# Patient Record
Sex: FEMALE | Race: BLACK OR AFRICAN AMERICAN | NOT HISPANIC OR LATINO | Employment: UNEMPLOYED | URBAN - METROPOLITAN AREA
[De-identification: names, ages, dates, MRNs, and addresses within clinical notes are randomized per-mention and may not be internally consistent; named-entity substitution may affect disease eponyms.]

---

## 2018-08-05 ENCOUNTER — HOSPITAL ENCOUNTER (EMERGENCY)
Facility: OTHER | Age: 43
Discharge: HOME | End: 2018-08-05
Attending: EMERGENCY MEDICINE

## 2018-08-05 VITALS
WEIGHT: 150 LBS | BODY MASS INDEX: 27.6 KG/M2 | HEART RATE: 68 BPM | HEIGHT: 62 IN | RESPIRATION RATE: 16 BRPM | SYSTOLIC BLOOD PRESSURE: 145 MMHG | TEMPERATURE: 99 F | DIASTOLIC BLOOD PRESSURE: 92 MMHG

## 2018-08-05 DIAGNOSIS — K08.89 PAIN, DENTAL: Primary | ICD-10-CM

## 2018-08-05 LAB
B-HCG UR QL: NEGATIVE
CTP QC/QA: YES

## 2018-08-05 PROCEDURE — 99283 EMERGENCY DEPT VISIT LOW MDM: CPT | Mod: 25

## 2018-08-05 PROCEDURE — 81025 URINE PREGNANCY TEST: CPT | Performed by: EMERGENCY MEDICINE

## 2018-08-05 RX ORDER — AMOXICILLIN 875 MG/1
875 TABLET, FILM COATED ORAL 2 TIMES DAILY
Qty: 14 TABLET | Refills: 0 | Status: SHIPPED | OUTPATIENT
Start: 2018-08-05

## 2018-08-05 RX ORDER — NAPROXEN 500 MG/1
500 TABLET ORAL 2 TIMES DAILY WITH MEALS
Qty: 10 TABLET | Refills: 0 | Status: SHIPPED | OUTPATIENT
Start: 2018-08-05 | End: 2018-08-10

## 2018-08-05 RX ORDER — KETOROLAC TROMETHAMINE 30 MG/ML
15 INJECTION, SOLUTION INTRAMUSCULAR; INTRAVENOUS
Status: DISCONTINUED | OUTPATIENT
Start: 2018-08-05 | End: 2018-08-05 | Stop reason: HOSPADM

## 2018-08-06 NOTE — ED NOTES
Acute on chronic R foot pain x 2 years and new MATT molar pain x2 days, cracked molar w/ leola to MATT side. Pt is AAOx3, ambulatory with steady gait, respirations even and unlabored, skin is warm and dry with good turgor, no bruising or breakdown noted

## 2018-08-06 NOTE — ED PROVIDER NOTES
"Encounter Date: 8/5/2018    SCRIBE #1 NOTE: I, Mana Plunkett, am scribing for, and in the presence of, Dr. Lion.       History     Chief Complaint   Patient presents with    Dental Pain     top left x "a few days"    Foot Pain     right foot pain from fall years ago     Time seen by provider: 7:19 PM    This is a 43 y.o. female who presents with multiple complaints. Pt reports intermittent R foot pain since a slip and fall a few years ago. It is to the dorsal aspect. No new injury. Pt has seen another physician for the pain. She also c/o L upper tooth pain. She states that the tooth broke a few days ago. No relief with 800mg ibuprofen and Tylenol extra strength. Pt has been unable to see her dentist. She denies any fever, chills, facial swelling, drooling, cough, numbness, weakness, tingling, rash, and wound.      The history is provided by the patient.     Review of patient's allergies indicates:   Allergen Reactions    Peanut      History reviewed. No pertinent past medical history.  History reviewed. No pertinent surgical history.  History reviewed. No pertinent family history.  Social History   Substance Use Topics    Smoking status: Never Smoker    Smokeless tobacco: Not on file    Alcohol use Yes     Review of Systems   Constitutional: Negative for chills and fever.   HENT: Positive for dental problem (L upper molar). Negative for congestion, drooling, facial swelling, rhinorrhea and sore throat.    Respiratory: Negative for cough and shortness of breath.    Cardiovascular: Negative for chest pain.   Gastrointestinal: Negative for abdominal pain, diarrhea, nausea and vomiting.   Endocrine: Negative for polyuria.   Genitourinary: Negative for decreased urine volume and dysuria.   Musculoskeletal: Negative for back pain.        R foot pain.   Skin: Negative for rash and wound.   Allergic/Immunologic: Negative for immunocompromised state.   Neurological: Negative for dizziness, weakness and numbness. "        Negative for tingling.   Hematological: Does not bruise/bleed easily.   Psychiatric/Behavioral: Negative for confusion.       Physical Exam     Initial Vitals [08/05/18 1903]   BP Pulse Resp Temp SpO2   (!) 145/92 68 16 98.7 °F (37.1 °C) --      MAP       --         Physical Exam    Nursing note and vitals reviewed.  Constitutional: She appears well-developed and well-nourished. She is not diaphoretic. No distress.   HENT:   Head: Normocephalic and atraumatic.   Right Ear: External ear normal.   Left Ear: External ear normal.   Dental fracture to the L upper molar. No abscess.   Eyes: Right eye exhibits no discharge. Left eye exhibits no discharge.   Neck: Normal range of motion. Neck supple.   Cardiovascular:   Strong R DP pulse.   Pulmonary/Chest: No respiratory distress.   Musculoskeletal: Normal range of motion.   No R foot tenderness.   Neurological: She is alert and oriented to person, place, and time.   Skin: Skin is warm and dry. No rash noted. No erythema.   Psychiatric: She has a normal mood and affect. Her behavior is normal.         ED Course   Procedures  Labs Reviewed   POCT URINE PREGNANCY           Medical Decision Making:   Initial Assessment:   I recommend we give Toradol injection and discharge home on antiinflammatory and abx. Pt was not satisfied with the solution and requested to be prescribed percocet. I do not believe this was indicated and have concern for prescribing opioids with this presentation. We were at an impasse but will order medication so she can decide if she will take them. No medical emergency identified.   Clinical Tests:   Lab Tests: Ordered and Reviewed            Scribe Attestation:   Scribe #1: I performed the above scribed service and the documentation accurately describes the services I performed. I attest to the accuracy of the note.    Attending Attestation:           Physician Attestation for Scribe:  Physician Attestation Statement for Scribe #1: I  Ayad, reviewed documentation, as scribed by Mana Plunkett in my presence, and it is both accurate and complete.                    Clinical Impression:     1. Pain, dental            Disposition:   Disposition: Discharged  Condition: Stable                        Bill Lion MD  08/05/18 1942

## 2022-05-03 ENCOUNTER — HOSPITAL ENCOUNTER (EMERGENCY)
Facility: HOSPITAL | Age: 47
Discharge: HOME | End: 2022-05-03
Attending: EMERGENCY MEDICINE
Payer: MEDICARE

## 2022-05-03 VITALS
WEIGHT: 190 LBS | DIASTOLIC BLOOD PRESSURE: 85 MMHG | SYSTOLIC BLOOD PRESSURE: 163 MMHG | OXYGEN SATURATION: 95 % | HEIGHT: 66 IN | TEMPERATURE: 98 F | RESPIRATION RATE: 16 BRPM | BODY MASS INDEX: 30.53 KG/M2 | HEART RATE: 80 BPM

## 2022-05-03 DIAGNOSIS — M25.521 ELBOW PAIN, RIGHT: ICD-10-CM

## 2022-05-03 DIAGNOSIS — S30.0XXA CONTUSION OF BUTTOCK, INITIAL ENCOUNTER: ICD-10-CM

## 2022-05-03 DIAGNOSIS — S39.012A LUMBAR STRAIN, INITIAL ENCOUNTER: ICD-10-CM
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
## 2022-05-03 PROCEDURE — 25000003 PHARM REV CODE 250: Performed by: EMERGENCY MEDICINE

## 2022-05-03 PROCEDURE — 99284 EMERGENCY DEPT VISIT MOD MDM: CPT | Mod: 25

## 2022-05-03 RX ORDER — NAPROXEN 500 MG/1
500 TABLET ORAL 2 TIMES DAILY WITH MEALS
Qty: 20 TABLET | Refills: 0 | Status: SHIPPED | OUTPATIENT
Start: 2022-05-03 | End: 2022-05-08

## 2022-05-03 RX ORDER — CYCLOBENZAPRINE HCL 10 MG
10 TABLET ORAL 3 TIMES DAILY PRN
Qty: 20 TABLET | Refills: 0 | Status: SHIPPED | OUTPATIENT
Start: 2022-05-03 | End: 2022-05-13

## 2022-05-03 RX ORDER — METHOCARBAMOL 500 MG/1
1500 TABLET, FILM COATED ORAL
Status: COMPLETED | OUTPATIENT
Start: 2022-05-03 | End: 2022-05-03

## 2022-05-03 RX ORDER — HYDROCODONE BITARTRATE AND ACETAMINOPHEN 10; 325 MG/1; MG/1
1 TABLET ORAL
Status: COMPLETED | OUTPATIENT
Start: 2022-05-03 | End: 2022-05-03

## 2022-05-03 RX ORDER — HYDROCODONE BITARTRATE AND ACETAMINOPHEN 5; 325 MG/1; MG/1
1 TABLET ORAL EVERY 6 HOURS PRN
Qty: 12 TABLET | Refills: 0 | Status: SHIPPED | OUTPATIENT
Start: 2022-05-03 | End: 2022-05-13

## 2022-05-03 RX ORDER — NAPROXEN 500 MG/1
500 TABLET ORAL
Status: COMPLETED | OUTPATIENT
Start: 2022-05-03 | End: 2022-05-03

## 2022-05-03 RX ADMIN — HYDROCODONE BITARTRATE AND ACETAMINOPHEN 1 TABLET: 10; 325 TABLET ORAL at 12:05

## 2022-05-03 RX ADMIN — NAPROXEN 500 MG: 500 TABLET ORAL at 12:05

## 2022-05-03 RX ADMIN — METHOCARBAMOL 1500 MG: 500 TABLET ORAL at 12:05

## 2022-05-03 NOTE — ED PROVIDER NOTES
Encounter Date: 5/3/2022    SCRIBE #1 NOTE: I, Eladia Briseno, am scribing for, and in the presence of,  Gerald Monroe MD. I have scribed the following portions of the note - Other sections scribed: HPI, ROS, PE.       History     Chief Complaint   Patient presents with    Fall     Pt chief complaint is a slip and fall, pt states slipped in some water at [employer] complaining of lower back and right elbow pain. Pt denies loc.      Beau Richards, a 46 y.o. female with a pertinent past medical history of hypertension, presents to the ED with concerns after a fall that occurred today. Patient reports shopping a [employer] when she suddenly slipped on a puddle of water and fell. She fell on her right elbow, back, and buttocks. She did not hit her head or lose consciousness. She has associated symptoms of headaches, lower back pain, right flank pain, right elbow pain, buttocks pain, and upper, posterior right leg pain. She is compliant with her hypertension medication. No other exacerbating or alleviating factors. Patient denies blurred vision, nausea, vomiting, chest pain, abdominal pain, or other associated symptoms.     The history is provided by the patient. No  was used.     Review of patient's allergies indicates:   Allergen Reactions    Peanut      Past Medical History:   Diagnosis Date    Hypertension      Past Surgical History:   Procedure Laterality Date    NO PAST SURGERIES       History reviewed. No pertinent family history.  Social History     Tobacco Use    Smoking status: Never Smoker    Smokeless tobacco: Never Used   Substance Use Topics    Alcohol use: Yes    Drug use: No     Review of Systems   Eyes: Negative for visual disturbance.   Cardiovascular: Negative for chest pain.   Gastrointestinal: Negative for abdominal pain, nausea and vomiting.   Musculoskeletal: Positive for back pain (Lower).        (+) Buttocks pain  (+) Right elbow pain  (+) Upper, posterior  right leg pain  (+) Right flank pain   Neurological: Positive for headaches.       Physical Exam     Initial Vitals [05/03/22 1122]   BP Pulse Resp Temp SpO2   (!) 156/84 72 18 98 °F (36.7 °C) 96 %      MAP       --         Physical Exam    Nursing note and vitals reviewed.  Constitutional: She appears well-developed and well-nourished.   HENT:   Head: Normocephalic and atraumatic.   Eyes: EOM are normal. Pupils are equal, round, and reactive to light.   Neck: Neck supple. No thyromegaly present. No JVD present.   Normal range of motion.  Cardiovascular: Normal rate and regular rhythm. Exam reveals no gallop and no friction rub.    No murmur heard.  Pulmonary/Chest: Breath sounds normal. No respiratory distress.   Abdominal: Abdomen is soft. Bowel sounds are normal. There is no abdominal tenderness.   Musculoskeletal:         General: No edema. Normal range of motion.      Cervical back: Normal range of motion and neck supple.      Comments: L3, L4, L5 tenderness.  Right buttocks pain.  Decreased ROM of the right hip secondary to pain.  Decreased ROM of the right elbow secondary to pain.     Neurological: She is alert and oriented to person, place, and time. She has normal strength. GCS score is 15. GCS eye subscore is 4. GCS verbal subscore is 5. GCS motor subscore is 6.   Skin: Skin is warm and dry. Capillary refill takes less than 2 seconds.   Psychiatric: She has a normal mood and affect.         ED Course   Procedures  Labs Reviewed - No data to display       Imaging Results          X-Ray Elbow Complete Right (Final result)  Result time 05/03/22 13:50:09    Final result by Mario Kim MD (05/03/22 13:50:09)                 Impression:      No convincing evidence of acute fracture or dislocation.      Electronically signed by: Mario Kim  Date:    05/03/2022  Time:    13:50             Narrative:    EXAMINATION:  XR ELBOW COMPLETE 3 VIEW RIGHT    CLINICAL HISTORY:  . Pain in right  elbow    TECHNIQUE:  AP, lateral, and oblique views of the right elbow were performed.    COMPARISON:  None    FINDINGS:  No definite evidence of acute fracture or dislocation.  Joint spaces appear maintained.  No large elbow joint effusion is appreciated.  No radiopaque foreign body.                               X-Ray Lumbar Spine Ap And Lateral (Final result)  Result time 05/03/22 13:51:19    Final result by Mario Kim MD (05/03/22 13:51:19)                 Impression:      No convincing evidence of acute fracture or traumatic subluxation.      Electronically signed by: Mario Kim  Date:    05/03/2022  Time:    13:51             Narrative:    EXAMINATION:  XR LUMBAR SPINE AP AND LATERAL    CLINICAL HISTORY:  Lumbago s/p fall;    TECHNIQUE:  AP, lateral and spot images were performed of the lumbar spine.    COMPARISON:  None    FINDINGS:  Five non-rib-bearing lumbar-type vertebral bodies are seen.  No definite evidence of acute fracture or traumatic subluxation.  Vertebral body heights appear maintained.  No definite acute findings are seen in the visualized portions of the abdomen pelvis.  Phleboliths appear to project within the pelvis.  No acute soft tissue findings seen.                               X-Ray Hip 2 or 3 views Right (with Pelvis when performed) (Final result)  Result time 05/03/22 13:52:22    Final result by Mario Kim MD (05/03/22 13:52:22)                 Impression:      No convincing evidence of acute displaced fracture or dislocation.      Electronically signed by: Mario Kim  Date:    05/03/2022  Time:    13:52             Narrative:    EXAMINATION:  XR HIP WITH PELVIS WHEN PERFORMED, 2 OR 3  VIEWS RIGHT    CLINICAL HISTORY:  right hip pain;    TECHNIQUE:  AP view of the pelvis and frog leg lateral view of the right hip were performed.    COMPARISON:  None    FINDINGS:  No definite evidence of acute fracture or dislocation.  Joint spaces appear maintained.   Phleboliths appear to project within the pelvis.  No acute soft tissue findings seen.                                 Medications   HYDROcodone-acetaminophen  mg per tablet 1 tablet (1 tablet Oral Given 5/3/22 1209)   methocarbamoL tablet 1,500 mg (1,500 mg Oral Given 5/3/22 1209)   naproxen tablet 500 mg (500 mg Oral Given 5/3/22 1209)     Medical Decision Making:   History:   Old Medical Records: I decided to obtain old medical records.          Scribe Attestation:   Scribe #1: I performed the above scribed service and the documentation accurately describes the services I performed. I attest to the accuracy of the note.                 Clinical Impression:   Final diagnoses:  [M25.521] Elbow pain, right  [W19.XXXA] Fall, initial encounter (Primary)  [S39.012A] Lumbar strain, initial encounter  [S30.0XXA] Contusion of buttock, initial encounter          ED Disposition Condition    Discharge Stable       I, Gerald Monroe, personally performed the services described in this documentation. All medical record entries made by the scribe were at my direction and in my presence. I have reviewed the chart and agree that the record reflects my personal performance and is accurate and complete.    ED Prescriptions     Medication Sig Dispense Start Date End Date Auth. Provider    HYDROcodone-acetaminophen (NORCO) 5-325 mg per tablet Take 1 tablet by mouth every 6 (six) hours as needed (breakthrough pain). 12 tablet 5/3/2022 5/13/2022 Gerald Monroe MD    cyclobenzaprine (FLEXERIL) 10 MG tablet Take 1 tablet (10 mg total) by mouth 3 (three) times daily as needed for Muscle spasms. 20 tablet 5/3/2022 5/13/2022 Gerald Monroe MD    naproxen (NAPROSYN) 500 MG tablet Take 1 tablet (500 mg total) by mouth 2 (two) times daily with meals. for 5 days 20 tablet 5/3/2022 5/8/2022 Gerald Monroe MD        Follow-up Information     Follow up With Specialties Details Why Contact Info    Diana Hsu MD  Pediatrics Schedule an appointment as soon as possible for a visit   [address] [facility] [address]  [phone]      [facility] Emergency Dept Emergency Medicine  As needed, If symptoms worsen [address]  [facility] [address] [phone]    Chemo Oscar MD Orthopedic Surgery Schedule an appointment as soon as possible for a visit in 1 week As needed if elbow or other injuries are not improving. [address]  [address]  [facility] [address]  [phone]             Gerald Monroe MD  05/05/22 0631
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
## 2022-05-09 ENCOUNTER — HOSPITAL ENCOUNTER (EMERGENCY)
Facility: HOSPITAL | Age: 47
Discharge: HOME | End: 2022-05-09
Attending: EMERGENCY MEDICINE
Payer: COMMERCIAL

## 2022-05-09 VITALS
HEART RATE: 75 BPM | OXYGEN SATURATION: 98 % | WEIGHT: 175 LBS | TEMPERATURE: 99 F | BODY MASS INDEX: 28.12 KG/M2 | SYSTOLIC BLOOD PRESSURE: 142 MMHG | HEIGHT: 66 IN | DIASTOLIC BLOOD PRESSURE: 77 MMHG | RESPIRATION RATE: 18 BRPM
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
## 2022-05-09 PROCEDURE — 25000003 PHARM REV CODE 250: Performed by: PHYSICIAN ASSISTANT

## 2022-05-09 PROCEDURE — 99283 EMERGENCY DEPT VISIT LOW MDM: CPT | Mod: 25

## 2022-05-09 RX ORDER — ACETAMINOPHEN 500 MG
1000 TABLET ORAL
Status: COMPLETED | OUTPATIENT
Start: 2022-05-09 | End: 2022-05-09

## 2022-05-09 RX ADMIN — ACETAMINOPHEN 1000 MG: 500 TABLET ORAL at 04:05

## 2022-05-09 NOTE — DISCHARGE INSTRUCTIONS
Get some good rest.  Ice to the area to help with swelling and discomfort pain continue with Tylenol or ibuprofen as needed for pain.    Follow-up with primary care provider for re-evaluation should symptoms persist.  Return to this ED if knee becomes red and warm, if unable to walk or bear weight, if any other problems occur.
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
## 2022-12-28 ENCOUNTER — HOSPITAL ENCOUNTER (EMERGENCY)
Facility: OTHER | Age: 47
Discharge: HOME | End: 2022-12-28
Attending: EMERGENCY MEDICINE
Payer: MEDICARE

## 2022-12-28 VITALS
DIASTOLIC BLOOD PRESSURE: 87 MMHG | TEMPERATURE: 98 F | BODY MASS INDEX: 32.14 KG/M2 | OXYGEN SATURATION: 99 % | HEART RATE: 89 BPM | HEIGHT: 66 IN | WEIGHT: 200 LBS | RESPIRATION RATE: 20 BRPM | SYSTOLIC BLOOD PRESSURE: 150 MMHG
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
## 2022-12-28 PROCEDURE — 90471 IMMUNIZATION ADMIN: CPT | Performed by: PHYSICIAN ASSISTANT

## 2022-12-28 PROCEDURE — 90715 TDAP VACCINE 7 YRS/> IM: CPT | Performed by: PHYSICIAN ASSISTANT

## 2022-12-28 PROCEDURE — 25000003 PHARM REV CODE 250: Performed by: PHYSICIAN ASSISTANT

## 2022-12-28 PROCEDURE — 63600175 PHARM REV CODE 636 W HCPCS: Performed by: PHYSICIAN ASSISTANT

## 2022-12-28 PROCEDURE — 99284 EMERGENCY DEPT VISIT MOD MDM: CPT | Mod: 25

## 2022-12-28 RX ORDER — IBUPROFEN 600 MG/1
600 TABLET ORAL EVERY 6 HOURS PRN
Qty: 20 TABLET | Refills: 0 | Status: SHIPPED | OUTPATIENT
Start: 2022-12-28

## 2022-12-28 RX ORDER — IBUPROFEN 600 MG/1
600 TABLET ORAL
Status: COMPLETED | OUTPATIENT
Start: 2022-12-28 | End: 2022-12-28

## 2022-12-28 RX ORDER — BACITRACIN ZINC 500 UNIT/G
OINTMENT (GRAM) TOPICAL
Status: COMPLETED | OUTPATIENT
Start: 2022-12-28 | End: 2022-12-28

## 2022-12-28 RX ORDER — AMOXICILLIN AND CLAVULANATE POTASSIUM 875; 125 MG/1; MG/1
1 TABLET, FILM COATED ORAL 2 TIMES DAILY
Qty: 14 TABLET | Refills: 0 | Status: SHIPPED | OUTPATIENT
Start: 2022-12-28 | End: 2023-01-04

## 2022-12-28 RX ADMIN — BACITRACIN ZINC: 500 OINTMENT TOPICAL at 05:12

## 2022-12-28 RX ADMIN — IBUPROFEN 600 MG: 600 TABLET, FILM COATED ORAL at 04:12

## 2022-12-28 RX ADMIN — CLOSTRIDIUM TETANI TOXOID ANTIGEN (FORMALDEHYDE INACTIVATED), CORYNEBACTERIUM DIPHTHERIAE TOXOID ANTIGEN (FORMALDEHYDE INACTIVATED), BORDETELLA PERTUSSIS TOXOID ANTIGEN (GLUTARALDEHYDE INACTIVATED), BORDETELLA PERTUSSIS FILAMENTOUS HEMAGGLUTININ ANTIGEN (FORMALDEHYDE INACTIVATED), BORDETELLA PERTUSSIS PERTACTIN ANTIGEN, AND BORDETELLA PERTUSSIS FIMBRIAE 2/3 ANTIGEN 0.5 ML: 5; 2; 2.5; 5; 3; 5 INJECTION, SUSPENSION INTRAMUSCULAR at 04:12

## 2022-12-28 NOTE — ED TRIAGE NOTES
C/o dog bite to right lateral calf. Reports it was her neighbors dog that is vaccinated. Small puncture wound noted, bleeding controlled. Denies other injury. Unknown last tetanus. Also reports right upper dental pain for the last 2 weeks. Dental cavity noted to right molar. No facial swelling or fever noted.

## 2022-12-29 NOTE — ED PROVIDER NOTES
"     Source of History:  Patient     Chief complaint:  Leg Pain (Pt presented to ER with c/o leg pain due to an abrasion from breaking up a dog fight. Patient also c/o dental pain.)      HPI:  Beau Richards is a 47 y.o. female with hypertension who is presenting to emergency department with dog bite wound to right calf.  She was breaking up a fight between her dog and a neighbor's dog when the dog bit her at approximately 1:00 p.m. today.  Bleeding has subsided.  She is also reporting dental pain and gum swelling to right upper mouth.  No facial swelling, fever or trouble swallowing.  This is the extent to the patients complaints today here in the emergency department.    ROS: As per HPI and below:  General: No fever.  No chills.  No fatigue.  ENT: + dental pain and gum swelling  Neurologic: No focal weakness.  No numbness.  MSK: no  joint pain   Integument: + dog bite to right calf   Allergy/immunology:  Not immunocompromised.     Review of patient's allergies indicates:   Allergen Reactions    Peanut        PMH:  As per HPI and below:  Past Medical History:   Diagnosis Date    Hypertension      Past Surgical History:   Procedure Laterality Date    NO PAST SURGERIES         Social History     Tobacco Use    Smoking status: Never    Smokeless tobacco: Never   Substance Use Topics    Alcohol use: Yes    Drug use: No       Physical Exam:    BP (!) 150/87   Pulse 89   Temp 98 °F (36.7 °C)   Resp 20   Ht 5' 6" (1.676 m)   Wt 90.7 kg (200 lb)   SpO2 99%   BMI 32.28 kg/m²   Nursing note and vital signs reviewed.  Appearance: No acute distress.  ENT: Tenderness to percussion to right upper molars was gum tenderness as well.  No drainable abscess.  No facial swelling or trismus.  Musculoskeletal: Good range of motion all joints.  No deformities.  Neck supple.  No meningismus.  Skin: 1 cm abrasion to lateral right thigh. No ecchymosis   Neurologic: Motor intact.  Sensation intact.  Mental Status:  Alert and oriented " x 3.  Appropriate, conversant.   Labs that have been ordered have been independently reviewed and interpreted by myself.    I decided to obtain the patient's medical records.      MDM/ Differential Dx:    47 y.o. female who is presenting to the emergency department for evaluation of wound to right calf from dog bite as well as mouth pain.  Patient is afebrile, nontoxic appearing hemodynamically stable.  Wound is already scabbed over.  Location at low risk for infection.  Bacitracin applied to wound.  The patient appears to have pulpitis.  Based upon the history and physical I see no signs of Les's angina, sublingual swelling, facial swelling, airway compromise, facial cellulitis, sepsis, dehydration, or a fluctuant abscess to drain.  Will send home with antibiotics           Diagnostic Impression:    1. Dog bite, initial encounter         ED Disposition Condition    Discharge Stable                    Moris Alvarez PA-C  12/28/22 1430